# Patient Record
Sex: FEMALE | Race: WHITE | NOT HISPANIC OR LATINO | Employment: FULL TIME | URBAN - METROPOLITAN AREA
[De-identification: names, ages, dates, MRNs, and addresses within clinical notes are randomized per-mention and may not be internally consistent; named-entity substitution may affect disease eponyms.]

---

## 2019-03-14 ENCOUNTER — TELEPHONE (OUTPATIENT)
Dept: UROLOGY | Facility: AMBULATORY SURGERY CENTER | Age: 29
End: 2019-03-14

## 2019-03-14 NOTE — TELEPHONE ENCOUNTER
Reason for appointment/Complaint/Diagnosis : Kidney Stone     [facility]  If yes, what kind? Previous urologist?     None                  Records requested/where? [facility]    Outside testing/where? No    Location Preference for office visit? Patient to fax records to [phone]  Will call back to schedule

## 2019-03-15 NOTE — TELEPHONE ENCOUNTER
Left message; I have an opening for Wednesday, March 20th at 10:15 AM with Dr Mesfin Casas  I've booked her for it, but I'd like her to call back and confirm this appointment   ( There aren't a lot of openings for 30 minute appointments this upcoming week and I don't want it to be taken when she calls back later)

## 2019-03-20 ENCOUNTER — OFFICE VISIT (OUTPATIENT)
Dept: UROLOGY | Facility: MEDICAL CENTER | Age: 29
End: 2019-03-20
Payer: COMMERCIAL

## 2019-03-20 ENCOUNTER — TRANSCRIBE ORDERS (OUTPATIENT)
Dept: URGENT CARE | Facility: CLINIC | Age: 29
End: 2019-03-20

## 2019-03-20 ENCOUNTER — APPOINTMENT (OUTPATIENT)
Dept: LAB | Facility: CLINIC | Age: 29
End: 2019-03-20
Payer: COMMERCIAL

## 2019-03-20 VITALS
SYSTOLIC BLOOD PRESSURE: 116 MMHG | DIASTOLIC BLOOD PRESSURE: 72 MMHG | WEIGHT: 130 LBS | BODY MASS INDEX: 24.55 KG/M2 | HEART RATE: 84 BPM | HEIGHT: 61 IN

## 2019-03-20 DIAGNOSIS — N20.0 CALCULUS OF KIDNEY: Primary | ICD-10-CM

## 2019-03-20 DIAGNOSIS — N20.1 CALCULUS OF URETER: ICD-10-CM

## 2019-03-20 DIAGNOSIS — N20.0 CALCULUS OF KIDNEY: ICD-10-CM

## 2019-03-20 LAB
ALBUMIN SERPL BCP-MCNC: 3.8 G/DL (ref 3.5–5)
ALP SERPL-CCNC: 49 U/L (ref 46–116)
ALT SERPL W P-5'-P-CCNC: 23 U/L (ref 12–78)
ANION GAP SERPL CALCULATED.3IONS-SCNC: 5 MMOL/L (ref 4–13)
AST SERPL W P-5'-P-CCNC: 10 U/L (ref 5–45)
BASOPHILS # BLD AUTO: 0.06 THOUSANDS/ΜL (ref 0–0.1)
BASOPHILS NFR BLD AUTO: 1 % (ref 0–1)
BILIRUB SERPL-MCNC: 0.39 MG/DL (ref 0.2–1)
BUN SERPL-MCNC: 12 MG/DL (ref 5–25)
CALCIUM SERPL-MCNC: 8.6 MG/DL (ref 8.3–10.1)
CHLORIDE SERPL-SCNC: 107 MMOL/L (ref 100–108)
CO2 SERPL-SCNC: 25 MMOL/L (ref 21–32)
CREAT SERPL-MCNC: 0.67 MG/DL (ref 0.6–1.3)
EOSINOPHIL # BLD AUTO: 0.09 THOUSAND/ΜL (ref 0–0.61)
EOSINOPHIL NFR BLD AUTO: 1 % (ref 0–6)
ERYTHROCYTE [DISTWIDTH] IN BLOOD BY AUTOMATED COUNT: 12 % (ref 11.6–15.1)
GFR SERPL CREATININE-BSD FRML MDRD: 120 ML/MIN/1.73SQ M
GLUCOSE SERPL-MCNC: 122 MG/DL (ref 65–140)
HCT VFR BLD AUTO: 40.9 % (ref 34.8–46.1)
HGB BLD-MCNC: 14.4 G/DL (ref 11.5–15.4)
IMM GRANULOCYTES # BLD AUTO: 0.04 THOUSAND/UL (ref 0–0.2)
IMM GRANULOCYTES NFR BLD AUTO: 0 % (ref 0–2)
LYMPHOCYTES # BLD AUTO: 3.44 THOUSANDS/ΜL (ref 0.6–4.47)
LYMPHOCYTES NFR BLD AUTO: 28 % (ref 14–44)
MCH RBC QN AUTO: 31.6 PG (ref 26.8–34.3)
MCHC RBC AUTO-ENTMCNC: 35.2 G/DL (ref 31.4–37.4)
MCV RBC AUTO: 90 FL (ref 82–98)
MONOCYTES # BLD AUTO: 0.85 THOUSAND/ΜL (ref 0.17–1.22)
MONOCYTES NFR BLD AUTO: 7 % (ref 4–12)
NEUTROPHILS # BLD AUTO: 8.02 THOUSANDS/ΜL (ref 1.85–7.62)
NEUTS SEG NFR BLD AUTO: 63 % (ref 43–75)
NRBC BLD AUTO-RTO: 0 /100 WBCS
PLATELET # BLD AUTO: 284 THOUSANDS/UL (ref 149–390)
PMV BLD AUTO: 11.4 FL (ref 8.9–12.7)
POTASSIUM SERPL-SCNC: 3.8 MMOL/L (ref 3.5–5.3)
PROT SERPL-MCNC: 6.7 G/DL (ref 6.4–8.2)
RBC # BLD AUTO: 4.56 MILLION/UL (ref 3.81–5.12)
SL AMB  POCT GLUCOSE, UA: NORMAL
SL AMB LEUKOCYTE ESTERASE,UA: NORMAL
SL AMB POCT BILIRUBIN,UA: NORMAL
SL AMB POCT BLOOD,UA: NORMAL
SL AMB POCT CLARITY,UA: CLEAR
SL AMB POCT COLOR,UA: YELLOW
SL AMB POCT KETONES,UA: NORMAL
SL AMB POCT NITRITE,UA: NORMAL
SL AMB POCT PH,UA: 6.5
SL AMB POCT SPECIFIC GRAVITY,UA: 1.02
SL AMB POCT URINE PROTEIN: NORMAL
SL AMB POCT UROBILINOGEN: 0.2
SODIUM SERPL-SCNC: 137 MMOL/L (ref 136–145)
WBC # BLD AUTO: 12.5 THOUSAND/UL (ref 4.31–10.16)

## 2019-03-20 PROCEDURE — 99203 OFFICE O/P NEW LOW 30 MIN: CPT | Performed by: UROLOGY

## 2019-03-20 PROCEDURE — 36415 COLL VENOUS BLD VENIPUNCTURE: CPT

## 2019-03-20 PROCEDURE — 80053 COMPREHEN METABOLIC PANEL: CPT

## 2019-03-20 PROCEDURE — 81003 URINALYSIS AUTO W/O SCOPE: CPT | Performed by: UROLOGY

## 2019-03-20 PROCEDURE — 85025 COMPLETE CBC W/AUTO DIFF WBC: CPT

## 2019-03-20 PROCEDURE — 87086 URINE CULTURE/COLONY COUNT: CPT

## 2019-03-20 RX ORDER — ONDANSETRON 4 MG/1
TABLET, ORALLY DISINTEGRATING ORAL
COMMUNITY
Start: 2019-03-13 | End: 2019-03-20 | Stop reason: ALTCHOICE

## 2019-03-20 RX ORDER — CEFDINIR 300 MG/1
CAPSULE ORAL
COMMUNITY
Start: 2019-03-13 | End: 2019-03-20 | Stop reason: ALTCHOICE

## 2019-03-20 RX ORDER — METHYLPREDNISOLONE 4 MG/1
TABLET ORAL
COMMUNITY
Start: 2019-03-13 | End: 2019-03-20 | Stop reason: ALTCHOICE

## 2019-03-20 RX ORDER — TRAMADOL HYDROCHLORIDE 50 MG/1
TABLET ORAL
COMMUNITY
Start: 2019-03-13 | End: 2019-03-20 | Stop reason: ALTCHOICE

## 2019-03-20 RX ORDER — TAMSULOSIN HYDROCHLORIDE 0.4 MG/1
CAPSULE ORAL
COMMUNITY
Start: 2019-03-13 | End: 2019-03-20 | Stop reason: ALTCHOICE

## 2019-03-20 NOTE — H&P
Assessment/Plan:    No problem-specific Assessment & Plan notes found for this encounter  Diagnoses and all orders for this visit:    Calculus of kidney  -     POCT urine dip auto non-scope  -     Case request operating room: CYSTOSCOPY URETEROSCOPY WITH LITHOTRIPSY HOLMIUM LASER, RETROGRADE PYELOGRAM AND INSERTION STENT URETERAL; Standing  -     Case request operating room: CYSTOSCOPY URETEROSCOPY WITH LITHOTRIPSY HOLMIUM LASER, RETROGRADE PYELOGRAM AND INSERTION STENT URETERAL    Calculus of ureter    Other orders  -     Discontinue: cefdinir (OMNICEF) 300 mg capsule  -     Discontinue: methylprednisolone (MEDROL) 4 mg tablet  -     Discontinue: ondansetron (ZOFRAN-ODT) 4 mg disintegrating tablet  -     Discontinue: tamsulosin (FLOMAX) 0 4 mg  -     Discontinue: traMADol (ULTRAM) 50 mg tablet  -     Diet NPO; Sips with meds; Standing  -     Place sequential compression device; Standing          Subjective:      Patient ID: Elvin Amador is a 29 y o  female  HPI  Right renal colic:  Sudden onset of right flank pain on March 13  CT showed a 6 mm distal stone  The patient was seen at [facility], and the CT results are in the media section  Although relatively asymptomatic right now, the patient has no chance of passing the stone on her own  She will be scheduled for ureteroscopy in the near future a so she can put this episode behind her  The following portions of the patient's history were reviewed and updated as appropriate: allergies, current medications, past family history, past medical history, past social history, past surgical history and problem list     Review of Systems   Constitutional: Negative for activity change and fatigue  Respiratory: Negative for shortness of breath and wheezing  Cardiovascular: Negative for chest pain  Gastrointestinal: Negative for abdominal pain  Genitourinary: Negative for difficulty urinating, dysuria, frequency, hematuria and urgency  Musculoskeletal: Negative for back pain and gait problem  Skin: Negative  Allergic/Immunologic: Negative  Neurological: Negative  Psychiatric/Behavioral: Negative  Objective:      /72 (BP Location: Left arm, Patient Position: Sitting, Cuff Size: Standard)   Pulse 84   Ht 5' 1" (1 549 m)   Wt 59 kg (130 lb)   BMI 24 56 kg/m²           Physical Exam   Constitutional: She is oriented to person, place, and time  She appears well-developed and well-nourished  HENT:   Head: Normocephalic and atraumatic  Neck: Normal range of motion  Neck supple  Cardiovascular: Normal rate, regular rhythm and normal heart sounds  Pulmonary/Chest: Effort normal and breath sounds normal    Abdominal: Soft  Bowel sounds are normal  She exhibits no mass  There is tenderness  There is no guarding  Mild tenderness across the pelvis and suprapubic area without mass, guarding, rebound  Mild right CVA tenderness  Musculoskeletal: Normal range of motion  Neurological: She is alert and oriented to person, place, and time  She has normal reflexes  Psychiatric: She has a normal mood and affect   Her behavior is normal  Judgment and thought content normal

## 2019-03-20 NOTE — PROGRESS NOTES
Assessment/Plan:    Calculus of ureter  The patient has been scheduled for ureteroscopic stone removal on March 22, 2019  Diagnoses and all orders for this visit:    Calculus of kidney  -     POCT urine dip auto non-scope  -     Case request operating room: CYSTOSCOPY URETEROSCOPY WITH LITHOTRIPSY HOLMIUM LASER, RETROGRADE PYELOGRAM AND INSERTION STENT URETERAL; Standing  -     Case request operating room: CYSTOSCOPY URETEROSCOPY WITH LITHOTRIPSY HOLMIUM LASER, RETROGRADE PYELOGRAM AND INSERTION STENT URETERAL    Calculus of ureter    Other orders  -     Discontinue: cefdinir (OMNICEF) 300 mg capsule  -     Discontinue: methylprednisolone (MEDROL) 4 mg tablet  -     Discontinue: ondansetron (ZOFRAN-ODT) 4 mg disintegrating tablet  -     Discontinue: tamsulosin (FLOMAX) 0 4 mg  -     Discontinue: traMADol (ULTRAM) 50 mg tablet  -     Diet NPO; Sips with meds; Standing  -     Place sequential compression device; Standing          Subjective:      Patient ID: Bronson Valenzuela is a 29 y o  female  HPI  Right renal colic:  Sudden onset of right flank pain on March 13  CT showed a 6 mm distal stone  The patient was seen at [facility], and the CT results are in the media section  Although relatively asymptomatic right now, the patient has no chance of passing the stone on her own  She will be scheduled for ureteroscopy in the near future a so she can put this episode behind her  The following portions of the patient's history were reviewed and updated as appropriate: allergies, current medications, past family history, past medical history, past social history, past surgical history and problem list     Review of Systems   Constitutional: Negative for activity change and fatigue  Respiratory: Negative for shortness of breath and wheezing  Cardiovascular: Negative for chest pain  Gastrointestinal: Negative for abdominal pain     Genitourinary: Negative for difficulty urinating, dysuria, frequency, hematuria and urgency  Musculoskeletal: Negative for back pain and gait problem  Skin: Negative  Allergic/Immunologic: Negative  Neurological: Negative  Psychiatric/Behavioral: Negative  Objective:      /72 (BP Location: Left arm, Patient Position: Sitting, Cuff Size: Standard)   Pulse 84   Ht 5' 1" (1 549 m)   Wt 59 kg (130 lb)   LMP 02/27/2019 (Approximate)   BMI 24 56 kg/m²          Physical Exam   Constitutional: She is oriented to person, place, and time  She appears well-developed and well-nourished  HENT:   Head: Normocephalic and atraumatic  Neck: Normal range of motion  Neck supple  Cardiovascular: Normal rate, regular rhythm and normal heart sounds  Pulmonary/Chest: Effort normal and breath sounds normal    Abdominal: Soft  Bowel sounds are normal  She exhibits no mass  There is tenderness  There is no guarding  Mild tenderness across the pelvis and suprapubic area without mass, guarding, rebound  Mild right CVA tenderness  Musculoskeletal: Normal range of motion  Neurological: She is alert and oriented to person, place, and time  She has normal reflexes  Psychiatric: She has a normal mood and affect   Her behavior is normal  Judgment and thought content normal

## 2019-03-20 NOTE — H&P
Assessment/Plan:    Calculus of ureter  The patient has been scheduled for ureteroscopic stone removal on March 22, 2019  Diagnoses and all orders for this visit:    Calculus of kidney  -     POCT urine dip auto non-scope  -     Case request operating room: CYSTOSCOPY URETEROSCOPY WITH LITHOTRIPSY HOLMIUM LASER, RETROGRADE PYELOGRAM AND INSERTION STENT URETERAL; Standing  -     Case request operating room: CYSTOSCOPY URETEROSCOPY WITH LITHOTRIPSY HOLMIUM LASER, RETROGRADE PYELOGRAM AND INSERTION STENT URETERAL    Calculus of ureter    Other orders  -     Discontinue: cefdinir (OMNICEF) 300 mg capsule  -     Discontinue: methylprednisolone (MEDROL) 4 mg tablet  -     Discontinue: ondansetron (ZOFRAN-ODT) 4 mg disintegrating tablet  -     Discontinue: tamsulosin (FLOMAX) 0 4 mg  -     Discontinue: traMADol (ULTRAM) 50 mg tablet  -     Diet NPO; Sips with meds; Standing  -     Place sequential compression device; Standing          Subjective:      Patient ID: Cheryl Tucker is a 29 y o  female  HPI  Right renal colic:  Sudden onset of right flank pain on March 13  CT showed a 6 mm distal stone  The patient was seen at [facility], and the CT results are in the media section  Although relatively asymptomatic right now, the patient has no chance of passing the stone on her own  She will be scheduled for ureteroscopy in the near future a so she can put this episode behind her  The following portions of the patient's history were reviewed and updated as appropriate: allergies, current medications, past family history, past medical history, past social history, past surgical history and problem list     Review of Systems   Constitutional: Negative for activity change and fatigue  Respiratory: Negative for shortness of breath and wheezing  Cardiovascular: Negative for chest pain  Gastrointestinal: Negative for abdominal pain     Genitourinary: Negative for difficulty urinating, dysuria, frequency, hematuria and urgency  Musculoskeletal: Negative for back pain and gait problem  Skin: Negative  Allergic/Immunologic: Negative  Neurological: Negative  Psychiatric/Behavioral: Negative  Objective:      /72 (BP Location: Left arm, Patient Position: Sitting, Cuff Size: Standard)   Pulse 84   Ht 5' 1" (1 549 m)   Wt 59 kg (130 lb)   LMP 02/27/2019 (Approximate)   BMI 24 56 kg/m²           Physical Exam   Constitutional: She is oriented to person, place, and time  She appears well-developed and well-nourished  HENT:   Head: Normocephalic and atraumatic  Neck: Normal range of motion  Neck supple  Cardiovascular: Normal rate, regular rhythm and normal heart sounds  Pulmonary/Chest: Effort normal and breath sounds normal    Abdominal: Soft  Bowel sounds are normal  She exhibits no mass  There is tenderness  There is no guarding  Mild tenderness across the pelvis and suprapubic area without mass, guarding, rebound  Mild right CVA tenderness  Musculoskeletal: Normal range of motion  Neurological: She is alert and oriented to person, place, and time  She has normal reflexes  Psychiatric: She has a normal mood and affect   Her behavior is normal  Judgment and thought content normal

## 2019-03-21 ENCOUNTER — ANESTHESIA EVENT (OUTPATIENT)
Dept: PERIOP | Facility: HOSPITAL | Age: 29
End: 2019-03-21
Payer: COMMERCIAL

## 2019-03-21 PROBLEM — N20.0 CALCULUS OF KIDNEY: Status: ACTIVE | Noted: 2019-03-20

## 2019-03-21 LAB — BACTERIA UR CULT: NORMAL

## 2019-03-22 ENCOUNTER — ANESTHESIA (OUTPATIENT)
Dept: PERIOP | Facility: HOSPITAL | Age: 29
End: 2019-03-22
Payer: COMMERCIAL

## 2019-03-22 ENCOUNTER — HOSPITAL ENCOUNTER (OUTPATIENT)
Dept: RADIOLOGY | Facility: HOSPITAL | Age: 29
Setting detail: OUTPATIENT SURGERY
Discharge: HOME | End: 2019-03-22
Payer: COMMERCIAL

## 2019-03-22 ENCOUNTER — HOSPITAL ENCOUNTER (OUTPATIENT)
Facility: HOSPITAL | Age: 29
Setting detail: OUTPATIENT SURGERY
Discharge: HOME | End: 2019-03-22
Attending: UROLOGY | Admitting: UROLOGY
Payer: COMMERCIAL

## 2019-03-22 VITALS
RESPIRATION RATE: 18 BRPM | SYSTOLIC BLOOD PRESSURE: 105 MMHG | OXYGEN SATURATION: 97 % | DIASTOLIC BLOOD PRESSURE: 58 MMHG | BODY MASS INDEX: 24.55 KG/M2 | HEART RATE: 64 BPM | WEIGHT: 130 LBS | HEIGHT: 61 IN | TEMPERATURE: 98.5 F

## 2019-03-22 DIAGNOSIS — N20.0 CALCULUS OF KIDNEY: ICD-10-CM

## 2019-03-22 PROBLEM — N20.1 CALCULUS OF URETER: Status: RESOLVED | Noted: 2019-03-20 | Resolved: 2019-03-22

## 2019-03-22 LAB — EXT PREGNANCY TEST URINE: NEGATIVE

## 2019-03-22 PROCEDURE — 81025 URINE PREGNANCY TEST: CPT | Performed by: ANESTHESIOLOGY

## 2019-03-22 PROCEDURE — 52332 CYSTOSCOPY AND TREATMENT: CPT | Performed by: UROLOGY

## 2019-03-22 PROCEDURE — 52352 CYSTOURETERO W/STONE REMOVE: CPT | Performed by: UROLOGY

## 2019-03-22 PROCEDURE — 74420 UROGRAPHY RTRGR +-KUB: CPT

## 2019-03-22 PROCEDURE — 74018 RADEX ABDOMEN 1 VIEW: CPT

## 2019-03-22 PROCEDURE — 82360 CALCULUS ASSAY QUANT: CPT | Performed by: UROLOGY

## 2019-03-22 PROCEDURE — C2617 STENT, NON-COR, TEM W/O DEL: HCPCS | Performed by: UROLOGY

## 2019-03-22 DEVICE — STENT CONTOUR INJ 6FR 30CM: Type: IMPLANTABLE DEVICE | Site: URETER | Status: FUNCTIONAL

## 2019-03-22 RX ORDER — SODIUM CHLORIDE 9 MG/ML
125 INJECTION, SOLUTION INTRAVENOUS CONTINUOUS
Status: DISCONTINUED | OUTPATIENT
Start: 2019-03-22 | End: 2019-03-22 | Stop reason: HOSPADM

## 2019-03-22 RX ORDER — PROPOFOL 10 MG/ML
INJECTION, EMULSION INTRAVENOUS AS NEEDED
Status: DISCONTINUED | OUTPATIENT
Start: 2019-03-22 | End: 2019-03-22 | Stop reason: SURG

## 2019-03-22 RX ORDER — MIDAZOLAM HYDROCHLORIDE 1 MG/ML
INJECTION INTRAMUSCULAR; INTRAVENOUS AS NEEDED
Status: DISCONTINUED | OUTPATIENT
Start: 2019-03-22 | End: 2019-03-22 | Stop reason: SURG

## 2019-03-22 RX ORDER — ONDANSETRON 2 MG/ML
4 INJECTION INTRAMUSCULAR; INTRAVENOUS EVERY 6 HOURS PRN
Status: DISCONTINUED | OUTPATIENT
Start: 2019-03-22 | End: 2019-03-22 | Stop reason: HOSPADM

## 2019-03-22 RX ORDER — HEPARIN SODIUM 5000 [USP'U]/ML
5000 INJECTION, SOLUTION INTRAVENOUS; SUBCUTANEOUS ONCE
Status: COMPLETED | OUTPATIENT
Start: 2019-03-22 | End: 2019-03-22

## 2019-03-22 RX ORDER — ONDANSETRON 2 MG/ML
INJECTION INTRAMUSCULAR; INTRAVENOUS AS NEEDED
Status: DISCONTINUED | OUTPATIENT
Start: 2019-03-22 | End: 2019-03-22 | Stop reason: SURG

## 2019-03-22 RX ORDER — SULFAMETHOXAZOLE AND TRIMETHOPRIM 800; 160 MG/1; MG/1
1 TABLET ORAL DAILY
Qty: 7 TABLET | Refills: 0 | Status: SHIPPED | OUTPATIENT
Start: 2019-03-22 | End: 2019-03-29

## 2019-03-22 RX ORDER — KETOROLAC TROMETHAMINE 30 MG/ML
INJECTION, SOLUTION INTRAMUSCULAR; INTRAVENOUS AS NEEDED
Status: DISCONTINUED | OUTPATIENT
Start: 2019-03-22 | End: 2019-03-22 | Stop reason: SURG

## 2019-03-22 RX ORDER — CEFAZOLIN SODIUM 1 G/3ML
INJECTION, POWDER, FOR SOLUTION INTRAMUSCULAR; INTRAVENOUS AS NEEDED
Status: DISCONTINUED | OUTPATIENT
Start: 2019-03-22 | End: 2019-03-22 | Stop reason: SURG

## 2019-03-22 RX ORDER — CEFAZOLIN SODIUM 1 G/50ML
1000 SOLUTION INTRAVENOUS ONCE
Status: DISCONTINUED | OUTPATIENT
Start: 2019-03-22 | End: 2019-03-22

## 2019-03-22 RX ORDER — FENTANYL CITRATE 50 UG/ML
INJECTION, SOLUTION INTRAMUSCULAR; INTRAVENOUS AS NEEDED
Status: DISCONTINUED | OUTPATIENT
Start: 2019-03-22 | End: 2019-03-22 | Stop reason: SURG

## 2019-03-22 RX ORDER — FENTANYL CITRATE 50 UG/ML
25 INJECTION, SOLUTION INTRAMUSCULAR; INTRAVENOUS
Status: DISCONTINUED | OUTPATIENT
Start: 2019-03-22 | End: 2019-03-22 | Stop reason: HOSPADM

## 2019-03-22 RX ORDER — ACETAMINOPHEN 325 MG/1
650 TABLET ORAL EVERY 6 HOURS PRN
Status: DISCONTINUED | OUTPATIENT
Start: 2019-03-22 | End: 2019-03-22 | Stop reason: HOSPADM

## 2019-03-22 RX ORDER — GENTAMICIN SULFATE 40 MG/ML
INJECTION, SOLUTION INTRAMUSCULAR; INTRAVENOUS AS NEEDED
Status: DISCONTINUED | OUTPATIENT
Start: 2019-03-22 | End: 2019-03-22 | Stop reason: SURG

## 2019-03-22 RX ORDER — DEXAMETHASONE SODIUM PHOSPHATE 4 MG/ML
INJECTION, SOLUTION INTRA-ARTICULAR; INTRALESIONAL; INTRAMUSCULAR; INTRAVENOUS; SOFT TISSUE AS NEEDED
Status: DISCONTINUED | OUTPATIENT
Start: 2019-03-22 | End: 2019-03-22 | Stop reason: SURG

## 2019-03-22 RX ADMIN — LIDOCAINE HYDROCHLORIDE 80 MG: 20 INJECTION, SOLUTION INTRAVENOUS at 16:24

## 2019-03-22 RX ADMIN — CEFAZOLIN SODIUM 2000 MG: 1 INJECTION, POWDER, FOR SOLUTION INTRAMUSCULAR; INTRAVENOUS at 16:26

## 2019-03-22 RX ADMIN — DEXAMETHASONE SODIUM PHOSPHATE 4 MG: 4 INJECTION, SOLUTION INTRAMUSCULAR; INTRAVENOUS at 16:24

## 2019-03-22 RX ADMIN — KETOROLAC TROMETHAMINE 30 MG: 30 INJECTION, SOLUTION INTRAMUSCULAR at 16:46

## 2019-03-22 RX ADMIN — ONDANSETRON 4 MG: 2 INJECTION INTRAMUSCULAR; INTRAVENOUS at 16:39

## 2019-03-22 RX ADMIN — FENTANYL CITRATE 50 MCG: 50 INJECTION, SOLUTION INTRAMUSCULAR; INTRAVENOUS at 16:24

## 2019-03-22 RX ADMIN — MIDAZOLAM 2 MG: 1 INJECTION INTRAMUSCULAR; INTRAVENOUS at 16:24

## 2019-03-22 RX ADMIN — PROPOFOL 200 MG: 10 INJECTION, EMULSION INTRAVENOUS at 16:24

## 2019-03-22 RX ADMIN — IOHEXOL 9 ML: 240 INJECTION, SOLUTION INTRATHECAL; INTRAVASCULAR; INTRAVENOUS; ORAL at 17:03

## 2019-03-22 RX ADMIN — SODIUM CHLORIDE 125 ML/HR: 0.9 INJECTION, SOLUTION INTRAVENOUS at 12:53

## 2019-03-22 RX ADMIN — GENTAMICIN SULFATE 40 MG: 40 INJECTION, SOLUTION INTRAMUSCULAR; INTRAVENOUS at 16:38

## 2019-03-22 RX ADMIN — SODIUM CHLORIDE: 0.9 INJECTION, SOLUTION INTRAVENOUS at 16:40

## 2019-03-22 RX ADMIN — HEPARIN SODIUM 5000 UNITS: 5000 INJECTION INTRAVENOUS; SUBCUTANEOUS at 15:55

## 2019-03-22 NOTE — INTERIM OP NOTE
CYSTOSCOPY URETEROSCOPY WITH  RETROGRADE PYELOGRAM AND INSERTION STENT URETERAL, stone basket retrieval  Postoperative Note  PATIENT NAME: Alvenia Rubinstein  : 1990  MRN: [id number]  AL OR ROOM 08    Surgery Date: 3/22/2019    Preop Diagnosis:  Right ureteral calculus [N20 0]    Post-Op Diagnosis Codes:   Right ureteral calculus [N20 0]    Procedure(s) (LRB):  CYSTOSCOPY URETEROSCOPY WITH  RETROGRADE PYELOGRAM AND INSERTION STENT URETERAL, stone basket retrieval (Right)    Surgeon(s) and Role:     * Tawanna Reeder MD - Primary    Specimens:  ID Type Source Tests Collected by Time Destination   A : right kidney stone Calculus Ureter, Right STONE ANALYSIS Tawanna Reeder MD 3/22/2019 1648        Estimated Blood Loss:   Minimal    Anesthesia Type:   General     Findings:    Right distal ureteral calculus  Complications:   None    SIGNATURE: Tawanna Reeder MD   DATE: 2019   TIME: 5:06 PM

## 2019-03-22 NOTE — DISCHARGE SUMMARY
Discharge Summary - Susie Gudino 29 y o  female MRN: [id number]    Unit/Bed#: OR POOL Encounter: [id number]    Admission Date: 3/22/2019     Discharge Date:  03/22/2019     Admitting Diagnosis: Calculus of kidney [N20 0] right ureteral calculus    Admitting Provider: Rita Fagan MD    Discharging Provider: Rita Fagan MD    Primary Care Physician at Discharge: Slime Verdugo [phone]     HPI:This is a 29 y o  old female presented with Calculus of kidney [N20 0], the patient presented to [facility] with complaints of right lower abdominal and flank pain  CT scan performed at [facility] school call was read as right hydronephrosis secondary to a 6 mm distal right ureteral calculus  The patient presented to [address] [place] [address] for stone extraction  She was taken to the operating room underwent cystoscopy right retrograde pyelogram and right ureteroscopic stone extraction and stent insertion without complication or difficulty  The patient tolerated the procedure well and was discharged that same day in good condition voiding adequately  She was advised to contact this office if severe urinary bleeding intractable pain fever or persistent nausea and vomiting occur  The patient tolerated hospitalization quite well without complication    Allergies   Allergen Reactions    Morphine Edema    Ibuprofen Hives       Consults   None       Order Current Status    Stone analysis Collected (03/22/19 1648)          Procedures Performed: No orders of the defined types were placed in this encounter  Hospital Course:   Tolerated hospitalization well    Significant Findings, Care, Treatment and Services Provided:  Cystoscopy right retrograde pyelogram right ureteroscopic stone extraction stent insertion    Complications:  None    Discharge Diagnosis:  Right ureteral calculus    Condition at Discharge: good     Discharge instructions/Information to patient and family:   See after visit summary for information provided to patient and family  Provisions for Follow-Up Care:  See after visit summary for information related to follow-up care and any pertinent home health orders  Disposition: Home    Planned Readmission: No    Discharge Statement   I spent 30 minutes discharging the patient  This time was spent on the day of discharge  I had direct contact with the patient on the day of discharge  Additional documentation is required if more than 30 minutes were spent on discharge  Discharge Medications:  See after visit summary for reconciled discharge medications provided to patient and family          ?  ?

## 2019-03-22 NOTE — PROGRESS NOTES
Dr Oscar Reagan stated, bactrim [facility]   If pt needs pain med (tramadol) she can call the on call doctor and he can send an rx to pharmacy, pt does not need to strain urine

## 2019-03-22 NOTE — DISCHARGE INSTRUCTIONS
Kidney Stones   WHAT YOU NEED TO KNOW:   Kidney stones form in the urinary system when the water and waste in your urine are out of balance  When this happens, certain types of waste crystals separate from the urine  The crystals build up and form kidney stones  You may have 1 or more kidney stones  DISCHARGE INSTRUCTIONS:   Return to the emergency department if:   · You have vomiting that is not relieved by medicine  Contact your healthcare provider if:   · You have a fever  · You have trouble passing urine  · You see blood in your urine  · You have severe pain  · You have any questions or concerns about your condition or care  Medicines:   · NSAIDs , such as ibuprofen, help decrease swelling, pain, and fever  This medicine is available with or without a doctor's order  NSAIDs can cause stomach bleeding or kidney problems in certain people  If you take blood thinner medicine, always ask your healthcare provider if NSAIDs are safe for you  Always read the medicine label and follow directions  · Prescription medicine  may be given  Ask how to take this medicine safely  · Medicines  to balance your electrolytes may be needed  · Take your medicine as directed  Contact your healthcare provider if you think your medicine is not helping or if you have side effects  Tell him or her if you are allergic to any medicine  Keep a list of the medicines, vitamins, and herbs you take  Include the amounts, and when and why you take them  Bring the list or the pill bottles to follow-up visits  Carry your medicine list with you in case of an emergency  Follow up with your healthcare provider as directed: You may need to return for more tests  Write down your questions so you remember to ask them during your visits  Self-care:   · Drink plenty of liquids  Your healthcare provider may tell you to drink at least 8 to 12 (eight-ounce) cups of liquids each day   This helps flush out the kidney stones when you urinate  Water is the best liquid to drink  · Strain your urine every time you go to the bathroom  Urinate through a strainer or a piece of thin cloth to catch the stones  Take the stones to your healthcare provider so they can be sent to the lab for tests  This will help your healthcare providers plan the best treatment for you  · Eat a variety of healthy foods  Healthy foods include fruits, vegetables, whole-grain breads, low-fat dairy products, beans, and fish  You may need to limit how much sodium (salt) or protein you eat  Ask for information about the best foods for you  · Stay active  Your stones may pass more easily by if you stay active  Ask about the best activities for you  After you pass your kidney stones:  Once you have passed your kidney stones, your healthcare provider may  order a 24-hour urine test  Results from a 24-hour urine test will help your healthcare provider plan ways to prevent more stones from forming  If you are told to do a 24-hour test, your healthcare provider will give you more instructions  © 2017 [facility] Information is for End User's use only and may not be sold, redistributed or otherwise used for commercial purposes  All illustrations and images included in CareNotes® are the copyrighted property of A D A M , Inc  or Tom Nayan  The above information is an  only  It is not intended as medical advice for individual conditions or treatments  Talk to your doctor, nurse or pharmacist before following any medical regimen to see if it is safe and effective for you  Ureteral Stent Placement   WHAT YOU NEED TO KNOW:   Ureteral stent placement is a procedure to open a blocked or narrow ureter  The ureter is the tube that carries urine from your kidney into your bladder  A stent is a thin hollow plastic tube used to hold your ureter open and allow urine to flow  The stent may stay in for several weeks  DISCHARGE INSTRUCTIONS:   Medicines:   · Pain medicine  may be given to take away or decrease pain  Do not wait until the pain is severe before you take your medicine  · Antibiotics  help prevent infections  Your healthcare provider may prescribe these for you while your stent remains in  · Take your medicine as directed  Contact your healthcare provider if you think your medicine is not helping or if you have side effects  Tell him or her if you are allergic to any medicine  Keep a list of the medicines, vitamins, and herbs you take  Include the amounts, and when and why you take them  Bring the list or the pill bottles to follow-up visits  Carry your medicine list with you in case of an emergency  Follow up with your urologist as directed: You will need regular follow-up visits with your urologist as long as the stent remains in  He will check to make sure the stent is working properly  He may do urine cultures to check for infection  Write down your questions so you remember to ask them during your visits  Self-care:   · Drink liquids  as directed  Ask your healthcare provider how much liquid to drink each day and which liquids are best for you  Fluids such as cranberry or apple juice may be especially helpful to prevent urinary infections  · Return to normal activities  the day after your stent placement or as directed by your healthcare provider  · You may take a shower  the day after your stent placement if your healthcare provider says it is okay  Contact your healthcare provider or urologist if:   · You have a fever or chills  · You feel like you need to urinate often  · You have pain when you urinate or pain around your bladder or kidney  · You see blood in your urine or it looks cloudy  · You have questions or concerns about your condition or care  Seek care immediately or call 911 if:   · You urinate little or not at all  · You have severe pain in your abdomen    © 2017 [facility] Information is for End User's use only and may not be sold, redistributed or otherwise used for commercial purposes  All illustrations and images included in CareNotes® are the copyrighted property of A D A M , Inc  or Tom Spicer  The above information is an  only  It is not intended as medical advice for individual conditions or treatments  Talk to your doctor, nurse or pharmacist before following any medical regimen to see if it is safe and effective for you

## 2019-03-22 NOTE — ANESTHESIA PREPROCEDURE EVALUATION
Review of Systems/Medical History  Patient summary reviewed  Chart reviewed  No history of anesthetic complications     Cardiovascular  Negative cardio ROS    Pulmonary  Smoker cigarette smoker  ,        GI/Hepatic  Negative GI/hepatic ROS          Kidney stones,        Endo/Other  Negative endo/other ROS      GYN  Negative gynecology ROS          Hematology  Negative hematology ROS      Musculoskeletal  Back pain , lumbar pain,        Neurology    Headaches,    Psychology   Anxiety,              Physical Exam    Airway    Mallampati score: II  TM Distance: >3 FB  Neck ROM: full     Dental   No notable dental hx     Cardiovascular  Comment: Negative ROS, Rhythm: regular, Rate: normal, Cardiovascular exam normal    Pulmonary  Pulmonary exam normal Breath sounds clear to auscultation,     Other Findings        Anesthesia Plan  ASA Score- 2     Anesthesia Type- general with ASA Monitors  Additional Monitors:   Airway Plan:         Plan Factors-Patient not instructed to abstain from smoking on day of procedure  Patient smoked on day of surgery  Induction- intravenous  Postoperative Plan- Plan for postoperative opioid use  Informed Consent- Anesthetic plan and risks discussed with patient and mother

## 2019-03-22 NOTE — OP NOTE
OPERATIVE REPORT  PATIENT NAME: Richardson Benson    :  1990  MRN: [id number]  Pt Location: [facility] OR ROOM 08    SURGERY DATE: 3/22/2019    Surgeon(s) and Role:     * Lilly Anderson MD - Primary    Preop Diagnosis:  Calculus of kidney [N20 0]    Post-Op Diagnosis Codes:     * Calculus of kidney [N20 0]    Procedure(s) (LRB):  CYSTOSCOPY URETEROSCOPY WITH  RETROGRADE PYELOGRAM AND INSERTION STENT URETERAL, stone basket retrieval (Right)    Specimen(s):  ID Type Source Tests Collected by Time Destination   A : right kidney stone Calculus Ureter, Right STONE ANALYSIS Lilly Anderson MD 3/22/2019 1648        Estimated Blood Loss:   Minimal    Drains:  Ureteral Drain/Stent Right ureter 6 Fr  (Active)   Number of days: 0       Anesthesia Type:   General    Operative Indications:  Calculus of kidney [N20 0]  Right ureteral calculus    Operative Findings:  Right distal ureteral calculus see operative note below    Complications:   None    Procedure and Technique:  The patient was seen in the holding area and I reviewed her CT results with her  She confirmed that she has a right distal ureteral stone with pain on the right  I discussed cystoscopy right retrograde pyelography right ureteroscopic stone extraction or laser lithotripsy as well as stent insertion  The patient agreed understanding risks of infection bleeding retained stone or stone fragments need for stent potential need for additional operations potential stricture or perforation of the ureter or lower urinary tract  She agreed to the procedure  Patient was taken to the operating room identified by the surgeon and after induction of anesthesia was prepped and draped the usual sterile fashion in the dorsal lithotomy position  A 22 Faroese cystoscope was placed per urethra into the urinary bladder and the bladder and urethra visualized in their entirety    There were no intrinsic lesions of the bladder urethra no evidence of extrinsic mass compression and both ureteral orifices were normal position and configuration bilaterally with clear efflux seen from the left ureteral orifice only  At this point a 0 035 Fabio-coated floppy tip guidewire was inserted through the cystoscope and placed retrograde up the right ureteral orifice into the renal pelvis under fluoroscopic control  The cystoscope was removed from the patient and a [employer] Sub[employer] short rigid ureteral scope placed per urethra into the urinary bladder and retrograde up the right ureter a gallagher color spiculated stone was engaged in the distal most ureter  A flat wire basket Zhang type was passed through the ureteral scope and the stone was engaged and removed atraumatically  The ureteral scope was removed along with the stone and the basket  The wire was then used to introduce a 6 Western Kelsi ureteral stent which was placed over the wire and position proximally in the renal pelvis with 3 coils and after the wire was removed contrast injection reflected appropriate placement without extravasation or further filling defects in the upper urinary tract  The distal portion of the stent curled within the urinary bladder and was witnessed using the 22 Kinyarwanda cystoscope  At the conclusion the procedure only the stent was left in the patient's urinary tract and a trans urethral Dangler was cut just distal to the urethral meatus and tucked back into the vagina  The patient was then extubated and transferred to the recovery room in good condition  There were no complications     I was present for the entire procedure and A qualified resident physician was not available    Patient Disposition:  PACU     SIGNATURE: Savanah Vidal MD  DATE: March 22, 2019  TIME: 5:07 PM

## 2019-03-25 DIAGNOSIS — N23 RENAL COLIC: Primary | ICD-10-CM

## 2019-03-25 RX ORDER — TRAMADOL HYDROCHLORIDE 50 MG/1
50 TABLET ORAL EVERY 6 HOURS PRN
Qty: 30 TABLET | Refills: 0 | Status: SHIPPED | OUTPATIENT
Start: 2019-03-25

## 2019-03-25 NOTE — PROGRESS NOTES
Patient requested tramadol for stent colic  Prescription sent to pharmacy  Patient has had this before with no allergic reaction

## 2019-03-26 ENCOUNTER — TELEPHONE (OUTPATIENT)
Dept: UROLOGY | Facility: MEDICAL CENTER | Age: 29
End: 2019-03-26

## 2019-03-26 NOTE — TELEPHONE ENCOUNTER
Tramadol was sent to pt's pharm 3/25 by Dr Osvaldo Live  Responded to pt's e-mail sent 3/25 re: stent pain

## 2019-03-27 LAB
CA PHOS MFR STONE: 10 %
CALCIUM OXALATE DIHYDRATE MFR STONE IR: 10 %
COLOR STONE: NORMAL
COM MFR STONE: 80 %
COMMENT-STONE3: NORMAL
COMPOSITION: NORMAL
LABORATORY COMMENT REPORT: NORMAL
NIDUS STONE QL: NORMAL
PHOTO: NORMAL
SIZE STONE: NORMAL MM
STONE ANALYSIS-IMP: NORMAL
WT STONE: 25 MG

## 2019-03-29 ENCOUNTER — PROCEDURE VISIT (OUTPATIENT)
Dept: UROLOGY | Facility: MEDICAL CENTER | Age: 29
End: 2019-03-29
Payer: COMMERCIAL

## 2019-03-29 VITALS
HEART RATE: 82 BPM | WEIGHT: 130 LBS | BODY MASS INDEX: 24.55 KG/M2 | DIASTOLIC BLOOD PRESSURE: 60 MMHG | HEIGHT: 61 IN | SYSTOLIC BLOOD PRESSURE: 110 MMHG

## 2019-03-29 DIAGNOSIS — N20.1 CALCULUS OF URETER: Primary | ICD-10-CM

## 2019-03-29 LAB
SL AMB  POCT GLUCOSE, UA: ABNORMAL
SL AMB LEUKOCYTE ESTERASE,UA: ABNORMAL
SL AMB POCT BILIRUBIN,UA: ABNORMAL
SL AMB POCT BLOOD,UA: ABNORMAL
SL AMB POCT CLARITY,UA: CLEAR
SL AMB POCT COLOR,UA: YELLOW
SL AMB POCT KETONES,UA: ABNORMAL
SL AMB POCT NITRITE,UA: ABNORMAL
SL AMB POCT PH,UA: 6
SL AMB POCT SPECIFIC GRAVITY,UA: 1.02
SL AMB POCT URINE PROTEIN: ABNORMAL
SL AMB POCT UROBILINOGEN: 1

## 2019-03-29 PROCEDURE — 99213 OFFICE O/P EST LOW 20 MIN: CPT | Performed by: UROLOGY

## 2019-03-29 PROCEDURE — 81003 URINALYSIS AUTO W/O SCOPE: CPT | Performed by: UROLOGY

## 2019-03-29 NOTE — PROGRESS NOTES
Assessment/Plan:    Calculus of ureter  Her stone has been taken care of  She has received appropriate dietary counseling  She will return p sandra Ramos Diagnoses and all orders for this visit:    Calculus of ureter  -     POCT urine dip auto non-scope          Subjective:      Patient ID: Tierra Marsh is a 29 y o  female  HPI  Right ureteral stone: The patient had ureteroscopic stone removal 1 week ago by Dr Chemo Ornelas  Her stent was removed with a Dangler today  She has made an uneventful recovery  Her stone was composed of calcium oxalate  She was given appropriate dietary counseling  The following portions of the patient's history were reviewed and updated as appropriate: allergies, current medications, past family history, past medical history, past social history, past surgical history and problem list     Review of Systems   Constitutional: Negative for activity change and fatigue  Respiratory: Negative for shortness of breath and wheezing  Cardiovascular: Negative for chest pain  Gastrointestinal: Negative for abdominal pain  Genitourinary: Negative for difficulty urinating, dysuria, frequency, hematuria and urgency  Musculoskeletal: Negative for back pain and gait problem  Skin: Negative  Allergic/Immunologic: Negative  Neurological: Negative  Psychiatric/Behavioral: Negative  Objective:      /60 (BP Location: Left arm, Patient Position: Sitting, Cuff Size: Adult)   Pulse 82   Ht 5' 1" (1 549 m)   Wt 59 kg (130 lb)   LMP 02/27/2019 (Approximate)   BMI 24 56 kg/m²          Physical Exam   Constitutional: She is oriented to person, place, and time  She appears well-developed and well-nourished  HENT:   Head: Normocephalic and atraumatic  Neck: Normal range of motion  Neck supple  Pulmonary/Chest: Effort normal    Musculoskeletal: Normal range of motion  Neurological: She is alert and oriented to person, place, and time   She has normal reflexes  Psychiatric: She has a normal mood and affect   Her behavior is normal  Judgment and thought content normal

## 2019-03-29 NOTE — LETTER
March 29, 2019     [facility], [address] [place] [address]    Patient: Babatunde Walker   YOB: 1990   Date of Visit: 3/29/2019       Dear Dr Du Said: Thank you for referring Babatunde Walker to me for evaluation  Below are my notes for this consultation  If you have questions, please do not hesitate to call me  I look forward to following your patient along with you  Sincerely,        Demetrius Skaggs MD        CC: No Recipients  Demetrius Skaggs MD  3/29/2019 11:46 AM  Incomplete  Assessment/Plan:    Calculus of ureter  Her stone has been taken care of  She has received appropriate dietary counseling  She will return p r n  Lajean Cassette Diagnoses and all orders for this visit:    Calculus of ureter  -     POCT urine dip auto non-scope          Subjective:      Patient ID: Babatunde Walker is a 29 y o  female  HPI  Right ureteral stone: The patient had ureteroscopic stone removal 1 week ago by Dr Saulo Ceron  Her stent was removed with a Dangler today  She has made an uneventful recovery  Her stone was composed of calcium oxalate  She was given appropriate dietary counseling  The following portions of the patient's history were reviewed and updated as appropriate: allergies, current medications, past family history, past medical history, past social history, past surgical history and problem list     Review of Systems   Constitutional: Negative for activity change and fatigue  Respiratory: Negative for shortness of breath and wheezing  Cardiovascular: Negative for chest pain  Gastrointestinal: Negative for abdominal pain  Genitourinary: Negative for difficulty urinating, dysuria, frequency, hematuria and urgency  Musculoskeletal: Negative for back pain and gait problem  Skin: Negative  Allergic/Immunologic: Negative  Neurological: Negative  Psychiatric/Behavioral: Negative            Objective:      /60 (BP Location: Left arm, Patient Position: Sitting, Cuff Size: Adult)   Pulse 82   Ht 5' 1" (1 549 m)   Wt 59 kg (130 lb)   LMP 02/27/2019 (Approximate)   BMI 24 56 kg/m²           Physical Exam   Constitutional: She is oriented to person, place, and time  She appears well-developed and well-nourished  HENT:   Head: Normocephalic and atraumatic  Neck: Normal range of motion  Neck supple  Pulmonary/Chest: Effort normal    Musculoskeletal: Normal range of motion  Neurological: She is alert and oriented to person, place, and time  She has normal reflexes  Psychiatric: She has a normal mood and affect   Her behavior is normal  Judgment and thought content normal

## 2019-03-29 NOTE — ASSESSMENT & PLAN NOTE
Her stone has been taken care of  She has received appropriate dietary counseling  She will return chandra Marks

## 2021-12-04 ENCOUNTER — HOSPITAL ENCOUNTER (EMERGENCY)
Facility: HOSPITAL | Age: 31
Discharge: HOME | End: 2021-12-04
Attending: EMERGENCY MEDICINE | Admitting: EMERGENCY MEDICINE
Payer: COMMERCIAL

## 2021-12-04 VITALS
SYSTOLIC BLOOD PRESSURE: 121 MMHG | DIASTOLIC BLOOD PRESSURE: 78 MMHG | RESPIRATION RATE: 25 BRPM | HEART RATE: 130 BPM | TEMPERATURE: 103.1 F | OXYGEN SATURATION: 97 %

## 2021-12-04 DIAGNOSIS — J06.9 VIRAL URI WITH COUGH: ICD-10-CM

## 2021-12-04 DIAGNOSIS — R50.9 FEVER: Primary | ICD-10-CM

## 2021-12-04 DIAGNOSIS — Z20.822 SUSPECTED COVID-19 VIRUS INFECTION: ICD-10-CM

## 2021-12-04 LAB
FLUAV RNA RESP QL NAA+PROBE: NEGATIVE
FLUBV RNA RESP QL NAA+PROBE: NEGATIVE
RSV RNA RESP QL NAA+PROBE: NEGATIVE
SARS-COV-2 RNA RESP QL NAA+PROBE: NEGATIVE

## 2021-12-04 PROCEDURE — 99284 EMERGENCY DEPT VISIT MOD MDM: CPT | Performed by: EMERGENCY MEDICINE

## 2021-12-04 PROCEDURE — 0241U HB NFCT DS VIR RESP RNA 4 TRGT: CPT | Performed by: EMERGENCY MEDICINE

## 2021-12-04 PROCEDURE — 99284 EMERGENCY DEPT VISIT MOD MDM: CPT

## 2021-12-04 RX ORDER — ACETAMINOPHEN 325 MG/1
975 TABLET ORAL ONCE
Status: COMPLETED | OUTPATIENT
Start: 2021-12-04 | End: 2021-12-04

## 2021-12-04 RX ORDER — ALBUTEROL SULFATE 90 UG/1
2 AEROSOL, METERED RESPIRATORY (INHALATION) EVERY 6 HOURS PRN
Qty: 6.7 G | Refills: 0 | Status: SHIPPED | OUTPATIENT
Start: 2021-12-04

## 2021-12-04 RX ORDER — PREDNISONE 20 MG/1
60 TABLET ORAL DAILY
Qty: 15 TABLET | Refills: 0 | Status: SHIPPED | OUTPATIENT
Start: 2021-12-04 | End: 2021-12-04

## 2021-12-04 RX ORDER — PREDNISONE 20 MG/1
60 TABLET ORAL DAILY
Qty: 15 TABLET | Refills: 0 | Status: SHIPPED | OUTPATIENT
Start: 2021-12-04 | End: 2021-12-09

## 2021-12-04 RX ORDER — PREDNISONE 20 MG/1
60 TABLET ORAL ONCE
Status: COMPLETED | OUTPATIENT
Start: 2021-12-04 | End: 2021-12-04

## 2021-12-04 RX ORDER — ALBUTEROL SULFATE 90 UG/1
2 AEROSOL, METERED RESPIRATORY (INHALATION) ONCE
Status: COMPLETED | OUTPATIENT
Start: 2021-12-04 | End: 2021-12-04

## 2021-12-04 RX ORDER — ALBUTEROL SULFATE 90 UG/1
2 AEROSOL, METERED RESPIRATORY (INHALATION) EVERY 6 HOURS PRN
Qty: 6.7 G | Refills: 0 | Status: SHIPPED | OUTPATIENT
Start: 2021-12-04 | End: 2021-12-04

## 2021-12-04 RX ADMIN — ACETAMINOPHEN 975 MG: 325 TABLET ORAL at 21:20

## 2021-12-04 RX ADMIN — PREDNISONE 60 MG: 20 TABLET ORAL at 22:03

## 2021-12-04 RX ADMIN — ALBUTEROL SULFATE 2 PUFF: 90 AEROSOL, METERED RESPIRATORY (INHALATION) at 22:03

## 2023-04-23 ENCOUNTER — HOSPITAL ENCOUNTER (EMERGENCY)
Facility: HOSPITAL | Age: 33
Discharge: HOME | End: 2023-04-23
Attending: EMERGENCY MEDICINE

## 2023-04-23 VITALS
TEMPERATURE: 97.1 F | RESPIRATION RATE: 16 BRPM | HEART RATE: 81 BPM | OXYGEN SATURATION: 96 % | DIASTOLIC BLOOD PRESSURE: 80 MMHG | SYSTOLIC BLOOD PRESSURE: 127 MMHG | WEIGHT: 145.5 LBS | BODY MASS INDEX: 27.49 KG/M2

## 2023-04-23 DIAGNOSIS — H66.002 NON-RECURRENT ACUTE SUPPURATIVE OTITIS MEDIA OF LEFT EAR WITHOUT SPONTANEOUS RUPTURE OF TYMPANIC MEMBRANE: Primary | ICD-10-CM

## 2023-04-23 RX ORDER — AMOXICILLIN 250 MG/1
500 CAPSULE ORAL ONCE
Status: COMPLETED | OUTPATIENT
Start: 2023-04-23 | End: 2023-04-23

## 2023-04-23 RX ORDER — AMOXICILLIN 500 MG/1
500 CAPSULE ORAL 3 TIMES DAILY
Qty: 30 CAPSULE | Refills: 0 | Status: SHIPPED | OUTPATIENT
Start: 2023-04-23 | End: 2023-05-03

## 2023-04-23 RX ADMIN — AMOXICILLIN 500 MG: 250 CAPSULE ORAL at 05:37

## 2023-04-23 NOTE — Clinical Note
Benjamin Jimenez was seen and treated in our emergency department on 4/23/2023  Diagnosis:     Yajaira Nails  may return to work on return date  She may return on this date: 04/24/2023         If you have any questions or concerns, please don't hesitate to call        Yodit Ochoa MD    ______________________________           _______________          _______________  Hospital Representative                              Date                                Time

## 2023-04-23 NOTE — ED PROVIDER NOTES
History  Chief Complaint   Patient presents with   • Earache     Woke up with left ear pain, took tylenol at 0100 with no relief  History provided by:  Medical records and patient  Earache  Location:  Left  Behind ear:  No abnormality  Quality:  Aching and dull  Severity:  Moderate  Onset quality:  Gradual  Duration:  1 hour  Timing:  Constant  Progression:  Unchanged  Chronicity:  New  Context comment:  Awoke with left ear pain 1 hour prior to arrival   Had some mild sore throat earlier this week  Relieved by:  Nothing  Worsened by:  Nothing  Ineffective treatments:  None tried  Associated symptoms: sore throat    Associated symptoms: no abdominal pain, no cough, no diarrhea, no ear discharge, no fever, no headaches, no rash, no rhinorrhea and no vomiting        Prior to Admission Medications   Prescriptions Last Dose Informant Patient Reported? Taking? albuterol (Proventil HFA) 90 mcg/act inhaler   No No   Sig: Inhale 2 puffs every 6 (six) hours as needed for wheezing   traMADol (ULTRAM) 50 mg tablet   No No   Sig: Take 1 tablet (50 mg total) by mouth every 6 (six) hours as needed for moderate pain      Facility-Administered Medications: None       Past Medical History:   Diagnosis Date   • Back pain    • Claustrophobia    • Headache    • Kidney stones    • Motion sickness    • Wears glasses        Past Surgical History:   Procedure Laterality Date   •  SECTION     • FL RETROGRADE PYELOGRAM  3/22/2019   • LA CYSTO/URETERO W/LITHOTRIPSY &INDWELL STENT INSRT Right 3/22/2019    Procedure: CYSTOSCOPY URETEROSCOPY WITH  RETROGRADE PYELOGRAM AND INSERTION STENT URETERAL, stone basket retrieval;  Surgeon: Liane Amador MD;  Location: [facility] OR;  Service: Urology       Family History   Problem Relation Age of Onset   • Lung cancer Family      I have reviewed and agree with the history as documented      E-Cigarette/Vaping   • E-Cigarette Use Never User      E-Cigarette/Vaping Substances Social History     Tobacco Use   • Smoking status: Every Day     Packs/day: 0 50     Years: 8 00     Pack years: 4 00     Types: Cigarettes   • Smokeless tobacco: Never   Vaping Use   • Vaping Use: Never used   Substance Use Topics   • Alcohol use: Not Currently   • Drug use: Never       Review of Systems   Constitutional: Negative for chills, fatigue and fever  HENT: Positive for ear pain and sore throat  Negative for ear discharge and rhinorrhea  Eyes: Negative for pain and visual disturbance  Respiratory: Negative for cough and shortness of breath  Cardiovascular: Negative for chest pain and palpitations  Gastrointestinal: Negative for abdominal pain, diarrhea, nausea and vomiting  Endocrine: Negative for polydipsia, polyphagia and polyuria  Genitourinary: Negative for difficulty urinating, dysuria, flank pain and hematuria  Musculoskeletal: Negative for arthralgias and back pain  Skin: Negative for color change and rash  Allergic/Immunologic: Negative for immunocompromised state  Neurological: Negative for dizziness, seizures, syncope, weakness and headaches  Psychiatric/Behavioral: Negative for confusion and self-injury  The patient is not nervous/anxious  All other systems reviewed and are negative  Physical Exam  Physical Exam  Vitals and nursing note reviewed  Constitutional:       General: She is not in acute distress  Appearance: Normal appearance  She is not ill-appearing, toxic-appearing or diaphoretic  HENT:      Head: Normocephalic and atraumatic  Right Ear: Tympanic membrane, ear canal and external ear normal  There is no impacted cerumen  Left Ear: Ear canal and external ear normal  There is no impacted cerumen  Ears:      Comments: Left TM erythematous, bulging, loss of light reflex     Nose: Nose normal  No congestion or rhinorrhea  Mouth/Throat:      Mouth: Mucous membranes are moist       Pharynx: Oropharynx is clear   No oropharyngeal exudate or posterior oropharyngeal erythema  Comments: No trismus  Eyes:      General:         Right eye: No discharge  Left eye: No discharge  Extraocular Movements: Extraocular movements intact  Conjunctiva/sclera: Conjunctivae normal       Pupils: Pupils are equal, round, and reactive to light  Cardiovascular:      Rate and Rhythm: Normal rate and regular rhythm  Pulses: Normal pulses  Heart sounds: Normal heart sounds  No murmur heard  No gallop  Pulmonary:      Effort: Pulmonary effort is normal  No respiratory distress  Breath sounds: Normal breath sounds  No stridor  No wheezing, rhonchi or rales  Chest:      Chest wall: No tenderness  Abdominal:      General: Bowel sounds are normal  There is no distension  Palpations: Abdomen is soft  There is no mass  Tenderness: There is no abdominal tenderness  There is no right CVA tenderness, left CVA tenderness, guarding or rebound  Hernia: No hernia is present  Musculoskeletal:         General: Normal range of motion  Cervical back: Normal range of motion and neck supple  Skin:     General: Skin is warm and dry  Capillary Refill: Capillary refill takes less than 2 seconds  Neurological:      General: No focal deficit present  Mental Status: She is alert and oriented to person, place, and time  Cranial Nerves: No cranial nerve deficit  Sensory: No sensory deficit  Motor: No weakness  Coordination: Coordination normal       Gait: Gait normal       Deep Tendon Reflexes: Reflexes normal    Psychiatric:         Mood and Affect: Mood normal          Behavior: Behavior normal          Thought Content:  Thought content normal          Judgment: Judgment normal          Vital Signs  ED Triage Vitals [04/23/23 0533]   Temperature Pulse Respirations Blood Pressure SpO2   (!) 97 1 °F (36 2 °C) 81 16 127/80 96 %      Temp Source Heart Rate Source Patient Position - Orthostatic VS BP Location FiO2 (%)   Temporal Monitor Sitting Left arm --      Pain Score       8           Vitals:    04/23/23 0533   BP: 127/80   Pulse: 81   Patient Position - Orthostatic VS: Sitting         Visual Acuity      ED Medications  Medications   amoxicillin (AMOXIL) capsule 500 mg (500 mg Oral Given 4/23/23 0537)       Diagnostic Studies  Results Reviewed     None                 No orders to display              Procedures  Procedures         ED Course                               SBIRT 22yo+    Flowsheet Row Most Recent Value   Initial Alcohol Screen: US AUDIT-C     1  How often do you have a drink containing alcohol? 0 Filed at: 04/23/2023 0534   2  How many drinks containing alcohol do you have on a typical day you are drinking? 0 Filed at: 04/23/2023 0534   3a  Male UNDER 65: How often do you have five or more drinks on one occasion? 0 Filed at: 04/23/2023 0534   3b  FEMALE Any Age, or MALE 65+: How often do you have 4 or more drinks on one occassion? 0 Filed at: 04/23/2023 0534   Audit-C Score 0 Filed at: 04/23/2023 1017   BRYON: How many times in the past year have you    Used an illegal drug or used a prescription medication for non-medical reasons? Never Filed at: 04/23/2023 0534                    Medical Decision Making  0532: Patient appears well, vital signs reviewed  Patient appears to be suffering from left otitis media  No evidence of malignant infection  I will start on antibiotics  Follow-up with PCP as needed          Disposition  Final diagnoses:   Non-recurrent acute suppurative otitis media of left ear without spontaneous rupture of tympanic membrane     Time reflects when diagnosis was documented in both MDM as applicable and the Disposition within this note     Time User Action Codes Description Comment    4/23/2023  5:35 AM Jennifer Pichardo Add [[id number]] Non-recurrent acute suppurative otitis media of left ear without spontaneous rupture of tympanic membrane       ED Disposition     ED Disposition   Discharge    Condition   Stable    Date/Time   Sun Apr 23, 2023  5:35 AM    Comment   Triston Long discharge to home/self care  Follow-up Information     Follow up With Specialties Details Why Contact Zayra Carroll DO General Practice Schedule an appointment as soon as possible for a visit  [facility]  [phone]            Patient's Medications   Discharge Prescriptions    AMOXICILLIN (AMOXIL) 500 MG CAPSULE    Take 1 capsule (500 mg total) by mouth 3 (three) times a day for 10 days       Start Date: 4/23/2023 End Date: 5/3/2023       Order Dose: 500 mg       Quantity: 30 capsule    Refills: 0       No discharge procedures on file      PDMP Review     None          ED Provider  Electronically Signed by           Libby Bowie MD  04/23/23 7202

## 2023-04-25 ENCOUNTER — HOSPITAL ENCOUNTER (EMERGENCY)
Facility: HOSPITAL | Age: 33
Discharge: HOME | End: 2023-04-25
Attending: EMERGENCY MEDICINE | Admitting: EMERGENCY MEDICINE

## 2023-04-25 VITALS
TEMPERATURE: 97.4 F | RESPIRATION RATE: 18 BRPM | OXYGEN SATURATION: 96 % | DIASTOLIC BLOOD PRESSURE: 78 MMHG | SYSTOLIC BLOOD PRESSURE: 114 MMHG | HEART RATE: 100 BPM

## 2023-04-25 DIAGNOSIS — H66.92 LEFT OTITIS MEDIA: ICD-10-CM

## 2023-04-25 DIAGNOSIS — H60.90 OTITIS EXTERNA: Primary | ICD-10-CM

## 2023-04-25 RX ORDER — OFLOXACIN 3 MG/ML
10 SOLUTION/ DROPS OPHTHALMIC ONCE
Status: COMPLETED | OUTPATIENT
Start: 2023-04-25 | End: 2023-04-25

## 2023-04-25 RX ADMIN — OFLOXACIN 10 DROP: 3 SOLUTION/ DROPS OPHTHALMIC at 09:20

## 2023-04-25 NOTE — DISCHARGE INSTRUCTIONS
Please see Ocuflox drops as we discussed   10 drops in the left ear 1 time a day for 7-10 days  Follow up with ENT, Dr Fran Dey number is provided   Return with new or worsening symptoms   Continue with Amoxacillin

## 2023-04-25 NOTE — ED PROVIDER NOTES
History  Chief Complaint   Patient presents with   • Earache     Patient seen in ED for same the other day and started on Amoxicillin  Patient now having pain in both ears and drainage from left  80-year-old female presents the emergency department for evaluation of earache  Patient states she was seen in the emergency department on  for the same  States she woke up with ear pain  Reports she has been congested  States today she now has pain in the right ear as well  States she is having drainage from the left ear  She denies any bloody drainage  She denies any fevers or chills  Reports muffled hearing  History provided by:  Patient  Earache  Location:  Bilateral  Behind ear:  No abnormality  Quality:  Pressure  Severity:  Moderate  Onset quality:  Gradual  Timing:  Constant  Progression:  Worsening  Chronicity:  New  Context: recent URI    Relieved by:  Nothing  Ineffective treatments:  OTC medications  Associated symptoms: congestion and ear discharge    Associated symptoms: no abdominal pain, no cough, no diarrhea, no fever, no headaches, no hearing loss, no neck pain, no rash, no rhinorrhea, no sore throat, no tinnitus and no vomiting        Prior to Admission Medications   Prescriptions Last Dose Informant Patient Reported? Taking?    albuterol (Proventil HFA) 90 mcg/act inhaler   No No   Sig: Inhale 2 puffs every 6 (six) hours as needed for wheezing   amoxicillin (AMOXIL) 500 mg capsule   No No   Sig: Take 1 capsule (500 mg total) by mouth 3 (three) times a day for 10 days   traMADol (ULTRAM) 50 mg tablet   No No   Sig: Take 1 tablet (50 mg total) by mouth every 6 (six) hours as needed for moderate pain      Facility-Administered Medications: None       Past Medical History:   Diagnosis Date   • Back pain    • Claustrophobia    • Headache    • Kidney stones 2019   • Motion sickness    • Wears glasses        Past Surgical History:   Procedure Laterality Date   •  SECTION     • FL RETROGRADE PYELOGRAM  3/22/2019   • IL CYSTO/URETERO W/LITHOTRIPSY &INDWELL STENT INSRT Right 3/22/2019    Procedure: CYSTOSCOPY URETEROSCOPY WITH  RETROGRADE PYELOGRAM AND INSERTION STENT URETERAL, stone basket retrieval;  Surgeon: Martha Mortensen MD;  Location: [facility];  Service: Urology       Family History   Problem Relation Age of Onset   • Lung cancer Family      I have reviewed and agree with the history as documented  E-Cigarette/Vaping   • E-Cigarette Use Never User      E-Cigarette/Vaping Substances     Social History     Tobacco Use   • Smoking status: Every Day     Packs/day: 0 50     Years: 8 00     Pack years: 4 00     Types: Cigarettes   • Smokeless tobacco: Never   Vaping Use   • Vaping Use: Never used   Substance Use Topics   • Alcohol use: Not Currently   • Drug use: Never       Review of Systems   Constitutional: Negative for chills, fatigue and fever  HENT: Positive for congestion, ear discharge and ear pain  Negative for hearing loss, rhinorrhea, sore throat and tinnitus  Respiratory: Negative  Negative for cough  Cardiovascular: Negative  Gastrointestinal: Negative for abdominal pain, diarrhea and vomiting  Musculoskeletal: Negative for neck pain  Skin: Negative for rash  Neurological: Negative for headaches  All other systems reviewed and are negative  Physical Exam  Physical Exam  Vitals and nursing note reviewed  Constitutional:       General: She is not in acute distress  Appearance: Normal appearance  She is not ill-appearing, toxic-appearing or diaphoretic  HENT:      Head: Normocephalic  Right Ear: Tympanic membrane, ear canal and external ear normal       Left Ear: Drainage and tenderness present  No mastoid tenderness  Ears:      Comments: Patient has purulent drainage from the left ear  TM not visualized  There is no redness behind the ear  No swelling       Nose: Nose normal       Mouth/Throat:      Mouth: Mucous membranes are moist       Pharynx: Oropharynx is clear  No oropharyngeal exudate or posterior oropharyngeal erythema  Eyes:      Conjunctiva/sclera: Conjunctivae normal       Pupils: Pupils are equal, round, and reactive to light  Pulmonary:      Effort: Pulmonary effort is normal    Musculoskeletal:      Cervical back: Normal range of motion  Lymphadenopathy:      Cervical: No cervical adenopathy  Skin:     General: Skin is warm and dry  Findings: No erythema  Neurological:      General: No focal deficit present  Mental Status: She is alert  Vital Signs  ED Triage Vitals [04/25/23 0842]   Temperature Pulse Respirations Blood Pressure SpO2   (!) 97 4 °F (36 3 °C) 100 18 114/78 96 %      Temp Source Heart Rate Source Patient Position - Orthostatic VS BP Location FiO2 (%)   Temporal Monitor Sitting Right arm --      Pain Score       --           Vitals:    04/25/23 0842   BP: 114/78   Pulse: 100   Patient Position - Orthostatic VS: Sitting         Visual Acuity      ED Medications  Medications   ofloxacin (OCUFLOX) 0 3 % ophthalmic solution 10 drop (10 drops Otic Given 4/25/23 0920)       Diagnostic Studies  Results Reviewed     None                 No orders to display              Procedures  Procedures         ED Course                               SBIRT 22yo+    Flowsheet Row Most Recent Value   Initial Alcohol Screen: US AUDIT-C     1  How often do you have a drink containing alcohol? 1 Filed at: 04/25/2023 0918   2  How many drinks containing alcohol do you have on a typical day you are drinking? 0 Filed at: 04/25/2023 0918   3a  Male UNDER 65: How often do you have five or more drinks on one occasion? 0 Filed at: 04/25/2023 0918   3b  FEMALE Any Age, or MALE 65+: How often do you have 4 or more drinks on one occassion? 0 Filed at: 04/25/2023 2078   Audit-C Score 1 Filed at: 04/25/2023 0574   BRYON: How many times in the past year have you        Used an illegal drug or used a prescription medication for non-medical reasons? Never Filed at: 04/25/2023 3950                    Medical Decision Making  51-year-old female presents the emergency department for evaluation of worsening ear pain bilaterally, left ear drainage  On amoxicillin for acute otitis media  Vitals and medical record reviewed  On exam patient has purulent drainage from the left ear concerning for otitis externa versus perforated tympanic membrane  Patient was started on ofloxacin drops  She will continue amoxicillin  We will follow-up with ENT specialist   There is no redness or swelling behind the ear, less concern for mastoiditis  No visualized foreign body in either ear  Patient has normal posterior oropharynx  No cervical adenopathy  I discussed all results and findings with the patient symptomatic treatment and return precautions which she verbalized understanding  Patient was clinically & hemodynamically stable for discharge    Left otitis media: acute illness or injury  Otitis externa: acute illness or injury  Risk  Prescription drug management  Disposition  Final diagnoses:   Otitis externa   Left otitis media     Time reflects when diagnosis was documented in both MDM as applicable and the Disposition within this note     Time User Action Codes Description Comment    4/25/2023  9:14 AM Shoaib Kinds Add [H60 90] Otitis externa     4/25/2023  9:15 AM Shoaib Kinds Add [H66 92] Left otitis media       ED Disposition     ED Disposition   Discharge    Condition   Stable    Date/Time   Tue Apr 25, 2023  9:13 AM    Comment   Chace Vizcarra discharge to home/self care  Follow-up Information     Follow up With Specialties Details Why [address] [facility], [address] [place] [address] [address] MD Otolaryngology   [address] [address]  [phone]            Discharge Medication List as of 4/25/2023  9:16 AM CONTINUE these medications which have NOT CHANGED    Details   albuterol (Proventil HFA) 90 mcg/act inhaler Inhale 2 puffs every 6 (six) hours as needed for wheezing, Starting Sat 12/4/2021, Normal      amoxicillin (AMOXIL) 500 mg capsule Take 1 capsule (500 mg total) by mouth 3 (three) times a day for 10 days, Starting Sun 4/23/2023, Until Wed 5/3/2023, Normal      traMADol (ULTRAM) 50 mg tablet Take 1 tablet (50 mg total) by mouth every 6 (six) hours as needed for moderate pain, Starting Mon 3/25/2019, Normal                 PDMP Review     None          ED Provider  Electronically Signed by           Ramsey Fajardo PA-C  04/25/23 5365

## 2023-07-29 NOTE — PRE-PROCEDURE INSTRUCTIONS
No outpatient medications have been marked as taking for the 3/22/19 encounter [facility] Encounter)  [facility] preop instructions reviewed with pt  Pt has dial antibacterial soap  No

## (undated) DEVICE — BASIC SINGLE BASIN-LF: Brand: MEDLINE INDUSTRIES, INC.

## (undated) DEVICE — ASTOUND STANDARD SURGICAL GOWN, XL: Brand: CONVERTORS

## (undated) DEVICE — BASKET SPEC RETRVL 3FR 90CM SS

## (undated) DEVICE — TUBING SUCTION 5MM X 12 FT

## (undated) DEVICE — GLOVE SRG BIOGEL 8

## (undated) DEVICE — SPECIMEN CONTAINER STERILE PEEL PACK

## (undated) DEVICE — INVIEW CLEAR LEGGINGS: Brand: CONVERTORS

## (undated) DEVICE — SYRINGE 20ML LL

## (undated) DEVICE — URETERAL CATHETER 5 FR CONE TIP

## (undated) DEVICE — ENDOSCOPIC VALVE WITH ADAPTER.: Brand: SURSEAL® II

## (undated) DEVICE — PACK TUR

## (undated) DEVICE — UROCATCH BAG